# Patient Record
Sex: FEMALE | Race: BLACK OR AFRICAN AMERICAN | NOT HISPANIC OR LATINO | Employment: FULL TIME | URBAN - METROPOLITAN AREA
[De-identification: names, ages, dates, MRNs, and addresses within clinical notes are randomized per-mention and may not be internally consistent; named-entity substitution may affect disease eponyms.]

---

## 2021-05-27 ENCOUNTER — SOCIAL WORK (OUTPATIENT)
Dept: ADMINISTRATIVE | Facility: OTHER | Age: 26
End: 2021-05-27

## 2021-06-07 PROBLEM — Z28.39 RUBELLA NON-IMMUNE STATUS, ANTEPARTUM: Status: ACTIVE | Noted: 2021-06-07

## 2021-06-07 PROBLEM — O09.899 RUBELLA NON-IMMUNE STATUS, ANTEPARTUM: Status: ACTIVE | Noted: 2021-06-07

## 2021-06-14 PROBLEM — O23.41 URINARY TRACT INFECTION IN MOTHER DURING FIRST TRIMESTER OF PREGNANCY: Status: ACTIVE | Noted: 2021-06-14

## 2021-06-14 PROBLEM — O09.899 HISTORY OF PRETERM DELIVERY, CURRENTLY PREGNANT: Status: ACTIVE | Noted: 2021-06-14

## 2024-10-22 ENCOUNTER — OFFICE VISIT (OUTPATIENT)
Dept: INTERNAL MEDICINE | Facility: CLINIC | Age: 29
End: 2024-10-22
Payer: COMMERCIAL

## 2024-10-22 ENCOUNTER — TELEPHONE (OUTPATIENT)
Dept: INTERNAL MEDICINE | Facility: CLINIC | Age: 29
End: 2024-10-22

## 2024-10-22 ENCOUNTER — CLINICAL SUPPORT (OUTPATIENT)
Dept: REHABILITATION | Facility: HOSPITAL | Age: 29
End: 2024-10-22
Payer: COMMERCIAL

## 2024-10-22 VITALS
HEART RATE: 89 BPM | OXYGEN SATURATION: 99 % | SYSTOLIC BLOOD PRESSURE: 94 MMHG | HEIGHT: 64 IN | TEMPERATURE: 97 F | DIASTOLIC BLOOD PRESSURE: 60 MMHG | BODY MASS INDEX: 32.41 KG/M2 | WEIGHT: 189.81 LBS

## 2024-10-22 DIAGNOSIS — R51.9 DAILY HEADACHE: Primary | ICD-10-CM

## 2024-10-22 DIAGNOSIS — Z90.79 H/O BILATERAL SALPINGECTOMY: ICD-10-CM

## 2024-10-22 DIAGNOSIS — R51.9 DAILY HEADACHE: ICD-10-CM

## 2024-10-22 DIAGNOSIS — G44.221 CHRONIC TENSION-TYPE HEADACHE, INTRACTABLE: Primary | ICD-10-CM

## 2024-10-22 DIAGNOSIS — J45.20 MILD INTERMITTENT ASTHMA WITHOUT COMPLICATION: ICD-10-CM

## 2024-10-22 PROBLEM — O09.899 HISTORY OF PRETERM DELIVERY, CURRENTLY PREGNANT: Status: RESOLVED | Noted: 2021-06-14 | Resolved: 2024-10-22

## 2024-10-22 PROBLEM — O23.41 URINARY TRACT INFECTION IN MOTHER DURING FIRST TRIMESTER OF PREGNANCY: Status: RESOLVED | Noted: 2021-06-14 | Resolved: 2024-10-22

## 2024-10-22 PROCEDURE — 1159F MED LIST DOCD IN RCRD: CPT | Mod: CPTII,S$GLB,, | Performed by: PHYSICIAN ASSISTANT

## 2024-10-22 PROCEDURE — 3074F SYST BP LT 130 MM HG: CPT | Mod: CPTII,S$GLB,, | Performed by: PHYSICIAN ASSISTANT

## 2024-10-22 PROCEDURE — 3008F BODY MASS INDEX DOCD: CPT | Mod: CPTII,S$GLB,, | Performed by: PHYSICIAN ASSISTANT

## 2024-10-22 PROCEDURE — 97530 THERAPEUTIC ACTIVITIES: CPT | Mod: PN

## 2024-10-22 PROCEDURE — 97161 PT EVAL LOW COMPLEX 20 MIN: CPT | Mod: PN

## 2024-10-22 PROCEDURE — 99999 PR PBB SHADOW E&M-NEW PATIENT-LVL V: CPT | Mod: PBBFAC,,, | Performed by: PHYSICIAN ASSISTANT

## 2024-10-22 PROCEDURE — 97140 MANUAL THERAPY 1/> REGIONS: CPT | Mod: PN

## 2024-10-22 PROCEDURE — 3078F DIAST BP <80 MM HG: CPT | Mod: CPTII,S$GLB,, | Performed by: PHYSICIAN ASSISTANT

## 2024-10-22 PROCEDURE — 99204 OFFICE O/P NEW MOD 45 MIN: CPT | Mod: S$GLB,,, | Performed by: PHYSICIAN ASSISTANT

## 2024-10-22 PROCEDURE — 1160F RVW MEDS BY RX/DR IN RCRD: CPT | Mod: CPTII,S$GLB,, | Performed by: PHYSICIAN ASSISTANT

## 2024-10-22 RX ORDER — AMITRIPTYLINE HYDROCHLORIDE 10 MG/1
10 TABLET, FILM COATED ORAL NIGHTLY
Qty: 30 TABLET | Refills: 1 | Status: SHIPPED | OUTPATIENT
Start: 2024-10-22 | End: 2025-10-22

## 2024-10-22 RX ORDER — AMITRIPTYLINE HYDROCHLORIDE 10 MG/1
10 TABLET, FILM COATED ORAL NIGHTLY
Qty: 30 TABLET | Refills: 1 | Status: SHIPPED | OUTPATIENT
Start: 2024-10-22 | End: 2024-10-22 | Stop reason: SDUPTHER

## 2024-10-22 NOTE — PLAN OF CARE
[facility]   Physical Therapy Initial Evaluation     Date: 10/22/2024   Name: Maria Isabel Cervantes  Clinic Number: [id number]    Therapy Diagnosis:   Encounter Diagnoses   Name Primary?    Daily headache     Chronic tension-type headache, intractable Yes     Physician: Juanita Nazario*    Physician Orders: PT Eval and Treat   Medical Diagnosis from Referral: Chronic Headache   Evaluation Date: 10/22/2024  Authorization Period Expiration: 12/31/2024  Plan of Care Expiration: 12/31/2024  Progress Note Due: 10th visit  Visit # / Visits authorized: 1/ 12   FOTO: 1/ 3     Precautions: Standard    Time In: 1:15p  Time Out: 2:00p  Total Appointment Time (timed & untimed codes): 45 minutes    SUBJECTIVE   Date of onset: 1.5 year ago    History of current condition - Maria Isabel reports: chronic headaches for the past year.   She reports chronic headaches persisting for approximately 1.5 years, occurring about 5 times per week. The location varies, and she describes the pain as feeling like her brain is squeezing and sometimes stabbing/ throbbing.   Associated symptoms include dizziness with intense headaches, forgetfulness, occasional nausea, occasional tinnitus, numbness in fingers and legs, and ankle locking.   Trigger: Bending over to  item from floor    Falls: none    Imaging, none: NA    Prior Therapy: none  Social History:  lives with their family  Occupation: teacher:    Prior Level of Function: Independent   Current Level of Function: Independent       Pain:  Current 3/10, worst 9/10, best 2/10   Location: bilateral neck  and scalp    Description: Aching and Throbbing  Aggravating Factors: Bending  Easing Factors: relaxation    Patients goals: decrease headache pain     Medical History:   Past Medical History:   Diagnosis Date    Abnormal Pap smear of cervix     Allergy     Anemia     Asthma        Surgical History:   Maria Isabel Cervantes  has a past surgical history that  includes Cervical conization w/ laser.    Medications:   Maria Isabel has a current medication list which includes the following prescription(s): albuterol, amitriptyline, cetirizine, fluticasone propionate, breatherite mdi spacer, right step prenatal vitamins, and prenatal vitamin.    Allergies:   Review of patient's allergies indicates:   Allergen Reactions    Pediazole [erythromycin-sulfisoxazole]     Rocephin [ceftriaxone] Rash          OBJECTIVE         CMS Impairment/Limitation/Restriction for FOTO Pain Survey    Therapist reviewed FOTO scores for Maria Isabel Cervantes on 10/22/2024.   FOTO documents entered into [employer] - see Media section.    Limitation Score: 48%            CERVICAL  (single inclinometer at top of head) AROM  (degrees/%) Pain/Dysfunction with Movement   Flexion 55    Extension 50    Right side bending 35    Left side bending 35    Right rotation 75     Left rotation 70      Strength:  U/E MMT Right Left Pain/Dysfunction with Movement   Cervical SB 5/5 5/5    Upper Trapezial 5/5 5/5    Shoulder Flexion 4+/5 5/5    Shoulder Abduction 5/5 5/5    Rhomboids 4+/5 4+/5    Mid Traps 4/5 4/5    Low Traps 4/5 4/5       Decreased MLT: Anterior chest musculature and latissimus dorsi.    Joint Mobility: mild OA and C1-2 and CTJ hypomobility    Palpation:   Patient tender with increased tone over bilateral  upper trapezial muscle, levator scapula, latissimus dorsi, teres minor, posterior deltoid, infraspinatus, subscapularis, anterior  chest muscle, posterior cervical muscle, sternocleidomastoid, AC joint, suboccipital muscle, medial/lateral scapular muscle's.     TREATMENT     Total Treatment time (time-based codes) separate from Evaluation: 18 minutes      Maria Isabel received the treatments listed below:    Maria Isabel received the following manual therapy techniques: Joint mobilizations, Manual traction, Soft tissue Mobilization, and dry needling were applied to the: cervical for 10 minutes, including:  Marya velázquez  Cervical  distraction  Subocc nods  FDN: UT, sub occ 2 pts, and T/2 multifidi with estim 5 min     Pt participated in dynamic functional therapeutic activities to improve functional performance for 8  minutes, including:  HEP:  SNAGS, open books, and cervical retraction         PATIENT EDUCATION AND HOME EXERCISES     Education provided:   - yes    Written Home Exercises Provided: yes. Exercises were reviewed and Maria Isabel was able to demonstrate them prior to the end of the session.  Maria Isabel demonstrated good  understanding of the education provided. See EMR under Patient Instructions for exercises provided during therapy sessions.    ASSESSMENT     Maria Isabel is a 29 y.o. female referred to outpatient Physical Therapy with a medical diagnosis of chronic headaches, pt presents with signs and symptoms consistent with diagnosis. The patient presents with impairments which include weakness, impaired self care skills, impaired functional mobility, decreased upper extremity function, pain, abnormal tone, decreased ROM, impaired joint extensibility, and impaired muscle length.  These impairments are limiting patient's ability to complete ADLs, home chores, , work related tasks, driving, and lifting.     Pt prognosis is Good due to personal factors and co-morbidities listed below.   Pt will benefit from skilled outpatient Physical Therapy to address the deficits stated above and in the chart below, provide pt/family education, and to maximize pt's level of independence.     Plan of care discussed with patient: Yes  Pt's spiritual, cultural and educational needs considered and patient is agreeable to the plan of care and goals as stated below:     Anticipated Barriers for therapy: none    Medical Necessity is demonstrated by the following  History  Co-morbidities and personal factors that may impact the plan of care Co-morbidities:   See medical hx    Personal Factors:   no deficits     low   Examination  Body Structures and  Functions, activity limitations and participation restrictions that may impact the plan of care Body Regions:   head  neck    Body Systems:    ROM  strength  joint mobility, muscle tone, muscle length    Participation Restrictions:   See current level of function listed above     Activity limitations:   Learning and applying knowledge  no deficits    General Tasks and Commands  no deficits    Communication  no deficits    Mobility  lifting and carrying objects    Self care  no deficits    Domestic Life  shopping  cooking  doing house work (cleaning house, washing dishes, laundry)    Interactions/Relationships  no deficits    Life Areas  employment    Community and Social Life  community life  recreation and leisure         low   Clinical Presentation stable and uncomplicated low   Decision Making/ Complexity Score: low     Goals: Reviewed:10/22/2024    Short Term Goals: In 6 weeks   1. Patient  to be educated on HEP.  2.Patient to have pain less than 5/10 at worst, in order to improve QOL.  3. Patient to report less than 3 headaches in a 7 day period    Long Term Goals: In 10 weeks  1. Patient to improve score on the FOTO to 40% or less, in order to improve QOL.  2. Patient to demo increase in UE strength to 5/5, in order to improve endurance with activity.  3. Patient to have decreased pain to 2/10 at worst, in order to improve QOL.  4. Patient to perform daily activities including work related tasks and home chores without increased symptoms.      PLAN     Plan of care Certification: 10/22/2024 to 1/1/2025.    Outpatient Physical Therapy 1-2 times weekly for 10 weeks to include the following interventions: Cervical/Lumbar Traction, Electrical Stimulation prn, Manual Therapy, Moist Heat/ Ice, Neuromuscular Re-ed, Orthotic Management and Training, Patient Education, Self Care, Therapeutic Activities, Therapeutic Exercise, and dry needling  .     Alicia Suarez, PT      I CERTIFY THE NEED FOR THESE SERVICES FURNISHED  UNDER THIS PLAN OF TREATMENT AND WHILE UNDER MY CARE   Physician's comments:     Physician's Signature: ___________________________________________________

## 2024-10-22 NOTE — PROGRESS NOTES
Subjective:      Patient ID: Maria Isabel Cervantes is a 29 y.o. female.    Chief Complaint: Headache (Pt states she has been having persistent headaches for over a year. Pt states her last insurance would not pay for a CT scan but now she has a different health insurance company. Pt states her headaches makes her very forgetful. Pt states her headaches can come from her bending down.)    Headache   This is a chronic problem. The current episode started more than 1 year ago. The problem occurs daily. The problem has been gradually worsening. The pain is located in the Bilateral region. Associated symptoms include dizziness (only when severe), muscle aches, rhinorrhea and tinnitus. Pertinent negatives include no abdominal pain, abnormal behavior, anorexia, back pain, blurred vision, coughing, drainage, ear pain, eye pain, eye redness, eye watering, facial sweating, fever, hearing loss, insomnia, loss of balance, nausea, neck pain, numbness, phonophobia, photophobia, scalp tenderness, seizures, sinus pressure, sore throat, swollen glands, tingling, visual change, vomiting, weakness or weight loss. Associated symptoms comments: Memory loss. Nothing aggravates the symptoms.   History of Present Illness    CHIEF COMPLAINT:  Ms. Cervantes presents today for chronic headaches.    CHRONIC HEADACHES:  She reports chronic headaches persisting for approximately 1.5 years, occurring about 5 times per week. The location varies, and she describes the pain as feeling like her brain is squeezing and sometimes stabbing. Associated symptoms include dizziness with intense headaches, forgetfulness, occasional nausea, occasional tinnitus, numbness in fingers and legs, and ankle locking. Bending over is identified as a trigger. She denies eye drainage, rhinnorhea, or vomiting.    PREVIOUS TREATMENTS:  Previous treatments have included antibiotics for suspected sinus problems and Flexeril for suspected tension headaches, both of which were  ineffective. Also failed tx with maxalt.     MEDICAL HISTORY:  She has a history of asthma. Stable and controlled with rescue inhaler.     FAMILY HISTORY:  She reports a family history of brain tumor in her paternal grandmother, who  from the condition, indicating a potentially high-risk background for cancer.    Medications were reviewed        Patient Active Problem List   Diagnosis    Rubella non-immune status, antepartum    Asthma    History of conization of cervix affecting pregnancy in first trimester    Daily headache    H/O bilateral salpingectomy         Current Outpatient Medications:     albuterol (PROVENTIL HFA) 90 mcg/actuation inhaler, Inhale 2 puffs into the lungs every 6 (six) hours as needed for Wheezing. Rescue, Disp: 18 g, Rfl: 0    amitriptyline (ELAVIL) 10 MG tablet, Take 1 tablet (10 mg total) by mouth every evening., Disp: 30 tablet, Rfl: 1    cetirizine (ZYRTEC) 10 MG tablet, Take 10 mg by mouth once daily., Disp: , Rfl:     fluticasone propionate (FLONASE) 50 mcg/actuation nasal spray, 1 spray (50 mcg total) by Each Nostril route once daily., Disp: 9.9 mL, Rfl: 0    inhalation spacing device (BREATHERITE MDI SPACER), Use as directed for inhalation., Disp: 1 each, Rfl: 0    prenatal vit-iron fum-folic ac (RIGHT STEP PRENATAL VITAMINS) 27 mg iron- 0.8 mg Tab, Take 1 tablet by mouth once daily., Disp: 30 tablet, Rfl: 1    PRENATAL VITAMIN 27 mg iron- 0.8 mg Tab, Take 1 tablet by mouth once daily., Disp: , Rfl:     Review of Systems   Constitutional:  Negative for activity change, appetite change, chills, diaphoresis, fatigue, fever, unexpected weight change and weight loss.   HENT:  Positive for congestion, postnasal drip, rhinorrhea and tinnitus. Negative for ear pain, hearing loss, sinus pressure, sore throat, trouble swallowing and voice change.    Eyes: Negative.  Negative for blurred vision, photophobia, pain, redness and visual disturbance.   Respiratory: Negative.  Negative for  "cough, choking, chest tightness and shortness of breath.    Cardiovascular:  Negative for chest pain, palpitations and leg swelling.   Gastrointestinal:  Negative for abdominal distention, abdominal pain, anorexia, blood in stool, constipation, diarrhea, nausea and vomiting.   Endocrine: Negative for cold intolerance, heat intolerance, polydipsia and polyuria.   Genitourinary: Negative.  Negative for difficulty urinating and frequency.   Musculoskeletal:  Negative for arthralgias, back pain, gait problem, joint swelling, myalgias and neck pain.   Skin:  Negative for color change, pallor, rash and wound.   Neurological:  Positive for dizziness (only when severe), speech difficulty (stutter) and headaches. Negative for tingling, tremors, seizures, syncope, facial asymmetry, weakness, light-headedness, numbness and loss of balance.   Hematological:  Negative for adenopathy.   Psychiatric/Behavioral:  Negative for behavioral problems, confusion, self-injury, sleep disturbance and suicidal ideas. The patient is not nervous/anxious and does not have insomnia.      Objective:   BP 94/60   Pulse 89   Temp 97 °F (36.1 °C) (Tympanic)   Ht 5' 4" (1.626 m)   Wt 86.1 kg (189 lb 13.1 oz)   LMP 10/02/2024   SpO2 99%   BMI 32.58 kg/m²     Physical Exam  Vitals reviewed.   Constitutional:       General: She is not in acute distress.     Appearance: Normal appearance. She is well-developed. She is not ill-appearing, toxic-appearing or diaphoretic.   HENT:      Head: Normocephalic and atraumatic.      Right Ear: Tympanic membrane, ear canal and external ear normal.      Left Ear: Tympanic membrane, ear canal and external ear normal.      Nose: Congestion and rhinorrhea present.      Mouth/Throat:      Mouth: Mucous membranes are moist.      Pharynx: No oropharyngeal exudate or posterior oropharyngeal erythema.   Eyes:      Conjunctiva/sclera: Conjunctivae normal.      Pupils: Pupils are equal, round, and reactive to light. "   Cardiovascular:      Rate and Rhythm: Normal rate and regular rhythm.      Heart sounds: Normal heart sounds. No murmur heard.     No friction rub. No gallop.   Pulmonary:      Effort: Pulmonary effort is normal. No respiratory distress.      Breath sounds: Normal breath sounds. No wheezing or rales.   Chest:      Chest wall: No tenderness.   Abdominal:      General: There is no distension.      Palpations: Abdomen is soft.      Tenderness: There is no abdominal tenderness.   Musculoskeletal:         General: Normal range of motion.      Cervical back: Normal range of motion and neck supple.   Lymphadenopathy:      Cervical: No cervical adenopathy.   Skin:     General: Skin is warm and dry.      Capillary Refill: Capillary refill takes less than 2 seconds.      Findings: No rash.   Neurological:      Mental Status: She is alert and oriented to person, place, and time.      Motor: No weakness.      Coordination: Coordination normal.      Gait: Gait normal.   Psychiatric:         Mood and Affect: Mood normal.         Behavior: Behavior normal.         Thought Content: Thought content normal.         Judgment: Judgment normal.         Assessment:     1. Daily headache    2. H/O bilateral salpingectomy    3. Mild intermittent asthma without complication      Plan:   Daily headache  -     amitriptyline (ELAVIL) 10 MG tablet; Take 1 tablet (10 mg total) by mouth every evening.  Dispense: 30 tablet; Refill: 1  -     Ambulatory referral/consult to Neurology; Future; Expected date: 10/29/2024  -     Ambulatory referral/consult to Physical/Occupational Therapy; Future; Expected date: 10/29/2024    H/O bilateral salpingectomy    Mild intermittent asthma without complication    -stable on rescue inhaler    Assessment & Plan    Assessed chronic headaches, occurring 5 times per week with increasing frequency and severity  Considered various headache types (tension, cluster) given diverse symptom presentation  Ruled out stress,  elevated blood pressure, and sinus problems as primary causes  Noted concerning symptoms: dizziness, nausea, numbness in extremities during headaches  Will initiate preventative medication and pursue further evaluation due to symptom complexity and family history of brain tumor    CHRONIC HEADACHES:  - Referred to neurology for further evaluation of chronic headaches.  - Referred to physical therapy for potential dry needling and other treatments for tension headaches.    MEDICATIONS/SUPPLEMENTS:  - Explained that the prescribed medication is preventative and requires time to become effective.  - Discussed potential side effects of the medication, including dizziness and fatigue.  - Started Elavil (amitriptyline) at a low dose to be taken 20-30 minutes before bedtime nightly.  - MsSelam Cervantes to maintain adequate hydration.    FOLLOW UP:  - Follow up in 1 month to assess effectiveness of Elavil.  - Contact the office if no communication from neurology within the next few weeks.  - Contact the office if experiencing any side effects from the medication.       Follow up in about 4 weeks (around 11/19/2024), or if symptoms worsen or fail to improve.

## 2024-10-22 NOTE — TELEPHONE ENCOUNTER
----- Message from Med Assistant Sylvie sent at 10/22/2024 10:06 AM CDT -----  Contact: [phone]  Please advise. I've canceled medication just need to reorder  ----- Message -----  From: Enrike Funk  Sent: 10/22/2024   9:55 AM CDT  To: Aravind REHMAN Staff (Int Med)    Type:  RX Refill Request    Who Called: DES MARIE [[id number]]  Refill or New Rx:REFILL   RX Name and Strength:amitriptyline (ELAVIL) 10 MG tablet  How is the patient currently taking it? (ex. 1XDay):  Is this a 30 day or 90 day RX:  Preferred Pharmacy with phone number:  Local or Mail Order:local  Ordering Provider:ARAVIND  Would the patient rather a call back or a response via [facility]? Call back  Best Call Back Number: [phone]  Additional Information: [id number] ..... IT WAS SENT TO THE [facility]        [facility] [employer] [address]

## 2024-12-09 ENCOUNTER — OFFICE VISIT (OUTPATIENT)
Dept: NEUROLOGY | Facility: CLINIC | Age: 29
End: 2024-12-09
Payer: COMMERCIAL

## 2024-12-09 ENCOUNTER — LAB VISIT (OUTPATIENT)
Dept: LAB | Facility: HOSPITAL | Age: 29
End: 2024-12-09
Payer: COMMERCIAL

## 2024-12-09 VITALS
DIASTOLIC BLOOD PRESSURE: 72 MMHG | WEIGHT: 188.06 LBS | SYSTOLIC BLOOD PRESSURE: 118 MMHG | OXYGEN SATURATION: 99 % | RESPIRATION RATE: 16 BRPM | HEIGHT: 64 IN | HEART RATE: 86 BPM | BODY MASS INDEX: 32.11 KG/M2

## 2024-12-09 DIAGNOSIS — R41.3 OTHER AMNESIA: ICD-10-CM

## 2024-12-09 DIAGNOSIS — G43.719 INTRACTABLE CHRONIC MIGRAINE WITHOUT AURA AND WITHOUT STATUS MIGRAINOSUS: Primary | ICD-10-CM

## 2024-12-09 DIAGNOSIS — R41.89 BRAIN FOG: ICD-10-CM

## 2024-12-09 DIAGNOSIS — H93.13 TINNITUS OF BOTH EARS: ICD-10-CM

## 2024-12-09 DIAGNOSIS — R51.9 DAILY HEADACHE: ICD-10-CM

## 2024-12-09 DIAGNOSIS — R20.2 NUMBNESS AND TINGLING: ICD-10-CM

## 2024-12-09 DIAGNOSIS — G44.221 CHRONIC TENSION-TYPE HEADACHE, INTRACTABLE: ICD-10-CM

## 2024-12-09 DIAGNOSIS — R26.89 BALANCE PROBLEM: ICD-10-CM

## 2024-12-09 DIAGNOSIS — G43.719 INTRACTABLE CHRONIC MIGRAINE WITHOUT AURA AND WITHOUT STATUS MIGRAINOSUS: ICD-10-CM

## 2024-12-09 DIAGNOSIS — R20.0 NUMBNESS AND TINGLING: ICD-10-CM

## 2024-12-09 DIAGNOSIS — R40.4 EPISODES OF STARING: ICD-10-CM

## 2024-12-09 DIAGNOSIS — R42 DIZZINESS AND GIDDINESS: ICD-10-CM

## 2024-12-09 PROBLEM — K81.0 ACUTE CHOLECYSTITIS: Status: ACTIVE | Noted: 2023-10-01

## 2024-12-09 PROBLEM — D27.1 DERMOID CYST OF LEFT OVARY: Status: ACTIVE | Noted: 2023-03-17

## 2024-12-09 LAB
ALBUMIN SERPL BCP-MCNC: 3.9 G/DL (ref 3.5–5.2)
ALP SERPL-CCNC: 73 U/L (ref 40–150)
ALT SERPL W/O P-5'-P-CCNC: 15 U/L (ref 10–44)
ANION GAP SERPL CALC-SCNC: 10 MMOL/L (ref 8–16)
AST SERPL-CCNC: 15 U/L (ref 10–40)
BASOPHILS # BLD AUTO: 0.01 K/UL (ref 0–0.2)
BASOPHILS NFR BLD: 0.2 % (ref 0–1.9)
BILIRUB SERPL-MCNC: 0.5 MG/DL (ref 0.1–1)
BUN SERPL-MCNC: 7 MG/DL (ref 6–20)
CALCIUM SERPL-MCNC: 9.4 MG/DL (ref 8.7–10.5)
CHLORIDE SERPL-SCNC: 107 MMOL/L (ref 95–110)
CO2 SERPL-SCNC: 24 MMOL/L (ref 23–29)
CREAT SERPL-MCNC: 0.9 MG/DL (ref 0.5–1.4)
CRP SERPL-MCNC: 1.2 MG/L (ref 0–8.2)
DIFFERENTIAL METHOD BLD: ABNORMAL
EOSINOPHIL # BLD AUTO: 0.2 K/UL (ref 0–0.5)
EOSINOPHIL NFR BLD: 4.3 % (ref 0–8)
ERYTHROCYTE [DISTWIDTH] IN BLOOD BY AUTOMATED COUNT: 12.7 % (ref 11.5–14.5)
ERYTHROCYTE [SEDIMENTATION RATE] IN BLOOD BY PHOTOMETRIC METHOD: 8 MM/HR (ref 0–36)
EST. GFR  (NO RACE VARIABLE): >60 ML/MIN/1.73 M^2
FOLATE SERPL-MCNC: 14.3 NG/ML (ref 4–24)
GLUCOSE SERPL-MCNC: 111 MG/DL (ref 70–110)
HCT VFR BLD AUTO: 36 % (ref 37–48.5)
HCYS SERPL-SCNC: 6.4 UMOL/L (ref 4–15.5)
HGB BLD-MCNC: 12 G/DL (ref 12–16)
HIV 1+2 AB+HIV1 P24 AG SERPL QL IA: NORMAL
IMM GRANULOCYTES # BLD AUTO: 0.01 K/UL (ref 0–0.04)
IMM GRANULOCYTES NFR BLD AUTO: 0.2 % (ref 0–0.5)
LYMPHOCYTES # BLD AUTO: 2.1 K/UL (ref 1–4.8)
LYMPHOCYTES NFR BLD: 40.6 % (ref 18–48)
MCH RBC QN AUTO: 29.9 PG (ref 27–31)
MCHC RBC AUTO-ENTMCNC: 33.3 G/DL (ref 32–36)
MCV RBC AUTO: 90 FL (ref 82–98)
MONOCYTES # BLD AUTO: 0.3 K/UL (ref 0.3–1)
MONOCYTES NFR BLD: 5.2 % (ref 4–15)
NEUTROPHILS # BLD AUTO: 2.6 K/UL (ref 1.8–7.7)
NEUTROPHILS NFR BLD: 49.5 % (ref 38–73)
NRBC BLD-RTO: 0 /100 WBC
PLATELET # BLD AUTO: 245 K/UL (ref 150–450)
PMV BLD AUTO: 10.5 FL (ref 9.2–12.9)
POTASSIUM SERPL-SCNC: 3.8 MMOL/L (ref 3.5–5.1)
PROT SERPL-MCNC: 7.4 G/DL (ref 6–8.4)
RBC # BLD AUTO: 4.01 M/UL (ref 4–5.4)
SODIUM SERPL-SCNC: 141 MMOL/L (ref 136–145)
T4 FREE SERPL-MCNC: 0.88 NG/DL (ref 0.71–1.51)
TREPONEMA PALLIDUM IGG+IGM AB [PRESENCE] IN SERUM OR PLASMA BY IMMUNOASSAY: NONREACTIVE
TSH SERPL DL<=0.005 MIU/L-ACNC: 0.85 UIU/ML (ref 0.4–4)
VIT B12 SERPL-MCNC: 232 PG/ML (ref 210–950)
WBC # BLD AUTO: 5.17 K/UL (ref 3.9–12.7)

## 2024-12-09 PROCEDURE — 1160F RVW MEDS BY RX/DR IN RCRD: CPT | Mod: CPTII,S$GLB,,

## 2024-12-09 PROCEDURE — 1159F MED LIST DOCD IN RCRD: CPT | Mod: CPTII,S$GLB,,

## 2024-12-09 PROCEDURE — 82746 ASSAY OF FOLIC ACID SERUM: CPT

## 2024-12-09 PROCEDURE — 36415 COLL VENOUS BLD VENIPUNCTURE: CPT

## 2024-12-09 PROCEDURE — 84439 ASSAY OF FREE THYROXINE: CPT

## 2024-12-09 PROCEDURE — 84443 ASSAY THYROID STIM HORMONE: CPT

## 2024-12-09 PROCEDURE — 3078F DIAST BP <80 MM HG: CPT | Mod: CPTII,S$GLB,,

## 2024-12-09 PROCEDURE — 85652 RBC SED RATE AUTOMATED: CPT

## 2024-12-09 PROCEDURE — 83090 ASSAY OF HOMOCYSTEINE: CPT

## 2024-12-09 PROCEDURE — 86140 C-REACTIVE PROTEIN: CPT

## 2024-12-09 PROCEDURE — 84425 ASSAY OF VITAMIN B-1: CPT

## 2024-12-09 PROCEDURE — 99999 PR PBB SHADOW E&M-EST. PATIENT-LVL V: CPT | Mod: PBBFAC,,,

## 2024-12-09 PROCEDURE — 3008F BODY MASS INDEX DOCD: CPT | Mod: CPTII,S$GLB,,

## 2024-12-09 PROCEDURE — 82607 VITAMIN B-12: CPT

## 2024-12-09 PROCEDURE — 3074F SYST BP LT 130 MM HG: CPT | Mod: CPTII,S$GLB,,

## 2024-12-09 PROCEDURE — 87389 HIV-1 AG W/HIV-1&-2 AB AG IA: CPT

## 2024-12-09 PROCEDURE — 86038 ANTINUCLEAR ANTIBODIES: CPT

## 2024-12-09 PROCEDURE — 86593 SYPHILIS TEST NON-TREP QUANT: CPT

## 2024-12-09 PROCEDURE — 85025 COMPLETE CBC W/AUTO DIFF WBC: CPT

## 2024-12-09 PROCEDURE — 99205 OFFICE O/P NEW HI 60 MIN: CPT | Mod: S$GLB,,,

## 2024-12-09 PROCEDURE — 80053 COMPREHEN METABOLIC PANEL: CPT

## 2024-12-09 RX ORDER — SUMATRIPTAN SUCCINATE 50 MG/1
50 TABLET ORAL DAILY PRN
Qty: 9 TABLET | Refills: 0 | Status: SHIPPED | OUTPATIENT
Start: 2024-12-09 | End: 2025-01-08

## 2024-12-09 RX ORDER — MONTELUKAST SODIUM 10 MG/1
TABLET ORAL
COMMUNITY
Start: 2024-07-17

## 2024-12-09 RX ORDER — ASPIRIN 325 MG
50000 TABLET, DELAYED RELEASE (ENTERIC COATED) ORAL
COMMUNITY
Start: 2024-06-13

## 2024-12-09 RX ORDER — HYDROCODONE BITARTRATE AND ACETAMINOPHEN 5; 325 MG/1; MG/1
TABLET ORAL
COMMUNITY
Start: 2024-11-15 | End: 2024-12-09

## 2024-12-09 RX ORDER — TOPIRAMATE 25 MG/1
25 TABLET ORAL 2 TIMES DAILY
Qty: 60 TABLET | Refills: 0 | Status: SHIPPED | OUTPATIENT
Start: 2024-12-09 | End: 2025-01-08

## 2024-12-09 NOTE — PROGRESS NOTES
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 SUPPLEMENTS:     Helpful supplements include Co-Q 10, B2, Mg, Feverfew (Dolovent combination) and butterbur (Petadolex)        NEUROPHARMACOLOGY     NEXT OPTIONS:     Topiramate/Topamax (TPM) slow titration to 50 mg BID which can cause mental slowing, transient tingling, kidney stones, weight loss, cleft lip and palate and rarely glaucoma and visual field defects . The patient was encouraged to drink a lot of fluids.     Zonisamide/Zonegran (ZNS) 100-400 mg QHS is a good alternative to TPM in case of SE/AE.     Amitriptyline/Elavil (TCA) slow titration to 100-Age which can cause sleepiness, dry eyes, dry mouth, urinary retention, and rarely cardiac arrhythmias    Propranolol/Inderal  (BB)slow titration to 80 mg BID which can cause low blood pressure, slow heart rate, erectile dysfunction, depression, airway obstruction and heart failure exacerbations. Cannot be used with migraine associate with focal neurological deficits.    Lamotrigine/Lamictal  (LTG)slow titration to 100 mg BID which can cause serious skin rash and rare cardiac arrhythmias. LTG is  superior to other therapies for specifically reducing migraine aura.     ANTI-CGRP AGENTS: Qulipta (alogepant) 60 mg QD, Erenumab (Aimovig) 140 mg SQ Pen monthly (Reported cases of Constipation and BP elevation) , Galcanezumab (Emgality) 120 mg SQ Pen monthly after a loading dose of 240 mg  and Fremnezumab (Ajovy) (Ligand Blocker): 225 mg SQ monthly or 675 mg every 3 months     Botox 200 units every 3 months.         LAST RESORT OPTIONS:      Namenda 10 mg BID     Valproic acid/ Depakote         NEUROMODULATION     Cefaly, Relivion, Nerivio and GammaCore (VNS)               WOMEN IN CHILD BEARING PERIOD     All migraine medications are not safe during pregnancy and the patient was made aware of this fact. Any pregnancy should be planned, and medications should be stopped PRIOR to pregnancy planning. Folic acid 1 mg daily was recommended. However, hormonal birth control complicates the management of migraine and can exacerbate migraine. If possible, mechanical contraception should be a better option.         PREGNANCY ISSUES         We had a lengthy discussion about managing migraine in patients who are trying to get pregnant or pregnant.    First, I recommend FA 1 mg QD     PRN medications are not safe. The safest option is Reglan PRN and not to be taken more than 2-3 times/week. Fioricet PRN is an option.     Traditional preventative options are not safe including Botox. Cefaly and Relivion are considered safe, but insurance does not cover it.        SCHOOL AND WORK ACCOMMODATIONS        Allow the patient to wear sunglasses or a cap and switch out fluorescent bulbs.    Allow the patient to arrive 5 minutes later and leave 5 minutes earlier to avoid noisy traffic.    Allow the patient to carry a water bottle and refill as needed.    Allow the patient to snack whenever is needed.    Allow the patient to decrease the computer brightness.    Allow the patient to take breaks as needed and extra time for assignments and  deadlines.    Allow the patient to avoid strenuous activity as needed.         MEDICAL/SURGICAL COMORBIDITIES     All relevant medical comorbidities noted and managed by primary care physician and medical care team.          HEALTHY LIFESTYLE AND PREVENTATIVE CARE    The patient to adhere to the age-appropriate health maintenance guidelines including screening tests and vaccinations. The patient to adhere to  healthy lifestyle, optimal weight, exercise, healthy diet, good sleep hygiene and avoiding drugs including smoking, alcohol and recreational drugs.    I spent a total of 72 minutes on the day of the visit.This includes face to face time and non-face to face time preparing to see the patient (eg, review of tests), obtaining and/or reviewing separately obtained history, documenting clinical information in the electronic or other health record, independently interpreting results and communicating results to the patient/family/caregiver, or care coordinator.     Please do not hesitate to contact me with any updates, questions or concerns.    No follow-ups on file.    Roberto Moseley, MSN, FNP-C    General Neurology

## 2024-12-10 LAB — ANA SER QL IF: NORMAL

## 2024-12-13 LAB — VIT B1 BLD-MCNC: 61 UG/L (ref 38–122)

## 2024-12-31 ENCOUNTER — HOSPITAL ENCOUNTER (OUTPATIENT)
Dept: PULMONOLOGY | Facility: HOSPITAL | Age: 29
Discharge: HOME | End: 2024-12-31
Payer: COMMERCIAL

## 2024-12-31 DIAGNOSIS — R40.4 EPISODES OF STARING: ICD-10-CM

## 2024-12-31 PROCEDURE — 95816 EEG AWAKE AND DROWSY: CPT

## 2025-01-02 PROBLEM — R40.4 EPISODES OF STARING: Status: ACTIVE | Noted: 2025-01-02

## 2025-01-03 NOTE — PROGRESS NOTES
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 migraine associate with focal neurological deficits (complicated migraine) in addition to drowsiness and potential impairment of driving ability. The patient verbalized understanding.    NSAIDs can cause peptic ulcers, renal insufficiency and may increase the risk of cardiovascular diseases.  SEs were discussed with the patient. The patient verbalized understanding.    Triptans have shown to be more effective than Gepatns with more SE/AE.      AVOID NARCOTICS (OPIATES)      1. No randomized controlled study shows pain-free results with opioids in the treatment of migraine.     2. The physiologic consequences of opioid use are adverse, occur quickly, and can be permanent. Decreased gray matter, release of calcitonin gene-related peptide, dynorphin, and pro-inflammatory peptides, and activation of excitatory glutamate receptors are all associated with opioid exposure.     3. Opioids are pro-nociceptive, prevent reversal of migraine  central sensitization, and interfere with triptan effectiveness.     4.Opioids precipitate bad clinical outcomes, especially transformation to daily headache.     5. They cause disease progression, comorbidity, and excessive health care consumption.           NEXT OPTIONS:    Triptans: Sumatriptan (Imitrex), Rizatriptan (Maxalt).    Gepants: Nuretc (rimegepant)75 mg >Ubrelvy (ubrogepant) 100 mg    Ditans: Reyvow (lasmiditan) 100 mg (No driving due to sedation)    Fioricet without codeine with Reglan.      Prednisone with Reglan.      LAST RESORT:     DHE NS Trudhesa (Max 2 a week)     C/I: concomitant use of vasoconstrictors like Triptans, strong CY inhibitors such as HAART PIs (eg, ritonavir, nelfinavir, or indinavir) and Macrolides (eg, erythromycin or clarithromycin), CAD, PVD, Stroke/TIA and Uncontrolled HTN.  Serious SEs include Vasospasm and Fibrosis (chronic use).       IMPENDING STATUS: Prednisone and Vistaril.    STATUS MIGRAINOSUS: ED-Infusion for Status Protocol.        PREVENTATIVE (MORE ACCURATELY MIGRAINE REDUCTION) MEDICATIONS:           Since the patient's headache is very frequent a lengthy discussion about preventative medications was carried out.The patient understands that prevention means DECREASING frequency and severity and NOT elimination.The patient was made aware that any new medication can cause serious allergic reaction.The medication is considered failure only if a therapeutic dose reached and maintained for 6-8 weeks.        HELPFUL SUPPLEMENTS:     Helpful supplements include Co-Q 10, B2, Mg, Feverfew (Dolovent combination) and butterbur (Petadolex)        NEUROPHARMACOLOGY     NEXT OPTIONS:     Topiramate/Topamax (TPM) slow titration to 50 mg BID which can cause mental slowing, transient tingling, kidney stones, weight loss, cleft lip and palate and rarely glaucoma and visual field defects . The patient was encouraged to drink a lot of fluids.     Zonisamide/Zonegran (ZNS) 100-400  mg QHS is a good alternative to TPM in case of SE/AE.     Amitriptyline/Elavil (TCA) slow titration to 100-Age which can cause sleepiness, dry eyes, dry mouth, urinary retention, and rarely cardiac arrhythmias    Propranolol/Inderal  (BB)slow titration to 80 mg BID which can cause low blood pressure, slow heart rate, erectile dysfunction, depression, airway obstruction and heart failure exacerbations. Cannot be used with migraine associate with focal neurological deficits.    Lamotrigine/Lamictal  (LTG)slow titration to 100 mg BID which can cause serious skin rash and rare cardiac arrhythmias. LTG is superior to other therapies for specifically reducing migraine aura.     ANTI-CGRP AGENTS: Qulipta (alogepant) 60 mg QD, Erenumab (Aimovig) 140 mg SQ Pen monthly (Reported cases of Constipation and BP elevation) , Galcanezumab (Emgality) 120 mg SQ Pen monthly after a loading dose of 240 mg  and Fremnezumab (Ajovy) (Ligand Blocker): 225 mg SQ monthly or 675 mg every 3 months     Botox 200 units every 3 months.         LAST RESORT OPTIONS:      Namenda 10 mg BID     Valproic acid/ Depakote         NEUROMODULATION     Cefaly, Relivion, Nerivio and GammaCore (VNS)               WOMEN IN CHILD BEARING PERIOD     All migraine medications are not safe during pregnancy and the patient was made aware of this fact. Any pregnancy should be planned, and medications should be stopped PRIOR to pregnancy planning. Folic acid 1 mg daily was recommended. However, hormonal birth control complicates the management of migraine and can exacerbate migraine. If possible, mechanical contraception should be a better option.         PREGNANCY ISSUES         We had a lengthy discussion about managing migraine in patients who are trying to get pregnant or pregnant.    First, I recommend FA 1 mg QD     PRN medications are not safe. The safest option is Reglan PRN and not to be taken more than 2-3 times/week. Fioricet PRN is an option.      Traditional preventative options are not safe including Botox. Cefaly and Relivion are considered safe, but insurance does not cover it.        SCHOOL AND WORK ACCOMMODATIONS        Allow the patient to wear sunglasses or a cap and switch out fluorescent bulbs.    Allow the patient to arrive 5 minutes later and leave 5 minutes earlier to avoid noisy traffic.    Allow the patient to carry a water bottle and refill as needed.    Allow the patient to snack whenever is needed.    Allow the patient to decrease the computer brightness.    Allow the patient to take breaks as needed and extra time for assignments and deadlines.    Allow the patient to avoid strenuous activity as needed.         MEDICAL/SURGICAL COMORBIDITIES     All relevant medical comorbidities noted and managed by primary care physician and medical care team.          HEALTHY LIFESTYLE AND PREVENTATIVE CARE    The patient to adhere to the age-appropriate health maintenance guidelines including screening tests and vaccinations. The patient to adhere to  healthy lifestyle, optimal weight, exercise, healthy diet, good sleep hygiene and avoiding drugs including smoking, alcohol and recreational drugs.    I spent a total of 47 minutes on the day of the visit.This includes face to face time and non-face to face time preparing to see the patient (eg, review of tests), obtaining and/or reviewing separately obtained history, documenting clinical information in the electronic or other health record, independently interpreting results and communicating results to the patient/family/caregiver, or care coordinator.     Please do not hesitate to contact me with any updates, questions or concerns.    No follow-ups on file.    Roberto Moseley, MSN, FNP-C    General Neurology

## 2025-01-06 ENCOUNTER — OFFICE VISIT (OUTPATIENT)
Dept: NEUROLOGY | Facility: CLINIC | Age: 30
End: 2025-01-06
Payer: COMMERCIAL

## 2025-01-06 VITALS
HEIGHT: 64 IN | DIASTOLIC BLOOD PRESSURE: 64 MMHG | WEIGHT: 188.06 LBS | HEART RATE: 69 BPM | BODY MASS INDEX: 32.11 KG/M2 | SYSTOLIC BLOOD PRESSURE: 97 MMHG

## 2025-01-06 DIAGNOSIS — R41.3 OTHER AMNESIA: ICD-10-CM

## 2025-01-06 DIAGNOSIS — G44.221 CHRONIC TENSION-TYPE HEADACHE, INTRACTABLE: ICD-10-CM

## 2025-01-06 DIAGNOSIS — R45.89 ANXIETY ABOUT TREATMENT: ICD-10-CM

## 2025-01-06 DIAGNOSIS — G43.719 INTRACTABLE CHRONIC MIGRAINE WITHOUT AURA AND WITHOUT STATUS MIGRAINOSUS: Primary | ICD-10-CM

## 2025-01-06 DIAGNOSIS — R20.2 NUMBNESS AND TINGLING: ICD-10-CM

## 2025-01-06 DIAGNOSIS — R20.0 NUMBNESS AND TINGLING: ICD-10-CM

## 2025-01-06 DIAGNOSIS — R42 DIZZINESS AND GIDDINESS: ICD-10-CM

## 2025-01-06 DIAGNOSIS — R51.9 DAILY HEADACHE: ICD-10-CM

## 2025-01-06 DIAGNOSIS — H93.13 TINNITUS OF BOTH EARS: ICD-10-CM

## 2025-01-06 DIAGNOSIS — Z51.81 MEDICATION MONITORING ENCOUNTER: ICD-10-CM

## 2025-01-06 DIAGNOSIS — R40.4 EPISODES OF STARING: ICD-10-CM

## 2025-01-06 DIAGNOSIS — R26.89 BALANCE PROBLEM: ICD-10-CM

## 2025-01-06 DIAGNOSIS — R41.89 BRAIN FOG: ICD-10-CM

## 2025-01-06 PROCEDURE — 99215 OFFICE O/P EST HI 40 MIN: CPT | Mod: S$GLB,,,

## 2025-01-06 PROCEDURE — 99999 PR PBB SHADOW E&M-EST. PATIENT-LVL V: CPT | Mod: PBBFAC,,,

## 2025-01-06 PROCEDURE — 1160F RVW MEDS BY RX/DR IN RCRD: CPT | Mod: CPTII,S$GLB,,

## 2025-01-06 PROCEDURE — 3074F SYST BP LT 130 MM HG: CPT | Mod: CPTII,S$GLB,,

## 2025-01-06 PROCEDURE — 3008F BODY MASS INDEX DOCD: CPT | Mod: CPTII,S$GLB,,

## 2025-01-06 PROCEDURE — 3078F DIAST BP <80 MM HG: CPT | Mod: CPTII,S$GLB,,

## 2025-01-06 PROCEDURE — 1159F MED LIST DOCD IN RCRD: CPT | Mod: CPTII,S$GLB,,

## 2025-01-06 RX ORDER — SUMATRIPTAN SUCCINATE 50 MG/1
50 TABLET ORAL DAILY PRN
Qty: 9 TABLET | Refills: 0 | Status: SHIPPED | OUTPATIENT
Start: 2025-01-06 | End: 2025-02-05

## 2025-01-06 RX ORDER — DIAZEPAM 5 MG/1
5 TABLET ORAL ONCE
Qty: 1 TABLET | Refills: 0 | Status: SHIPPED | OUTPATIENT
Start: 2025-01-06 | End: 2025-01-06

## 2025-01-06 RX ORDER — TOPIRAMATE 25 MG/1
25 TABLET ORAL 2 TIMES DAILY
Qty: 60 TABLET | Refills: 0 | Status: SHIPPED | OUTPATIENT
Start: 2025-01-06 | End: 2025-02-05

## 2025-01-08 ENCOUNTER — PATIENT MESSAGE (OUTPATIENT)
Dept: NEUROLOGY | Facility: CLINIC | Age: 30
End: 2025-01-08
Payer: COMMERCIAL

## 2025-01-13 ENCOUNTER — LAB VISIT (OUTPATIENT)
Dept: LAB | Facility: HOSPITAL | Age: 30
End: 2025-01-13
Payer: COMMERCIAL

## 2025-01-13 DIAGNOSIS — Z51.81 MEDICATION MONITORING ENCOUNTER: ICD-10-CM

## 2025-01-13 LAB
CREAT SERPL-MCNC: 0.8 MG/DL (ref 0.5–1.4)
EST. GFR  (NO RACE VARIABLE): >60 ML/MIN/1.73 M^2

## 2025-01-13 PROCEDURE — 36415 COLL VENOUS BLD VENIPUNCTURE: CPT

## 2025-01-13 PROCEDURE — 82565 ASSAY OF CREATININE: CPT

## 2025-01-23 NOTE — TELEPHONE ENCOUNTER
What did the pharmacy say about her valium order? It says on my end the prescription was confirmed by her pharmacy.      Disp Refills Start End   diazePAM (VALIUM) 5 MG tablet 1 tablet 0 1/6/2025 1/6/2025   Sig - Route: Take 1 tablet (5 mg total) by mouth once. 1 tab 30 minutes before MRI for 1 dose - Oral   Sent to pharmacy as: diazePAM (VALIUM) 5 MG tablet   E-Prescribing Status: Receipt confirmed by pharmacy (1/6/2025  8:52 AM CST)

## 2025-01-31 ENCOUNTER — PATIENT MESSAGE (OUTPATIENT)
Dept: NEUROLOGY | Facility: CLINIC | Age: 30
End: 2025-01-31
Payer: COMMERCIAL

## 2025-01-31 ENCOUNTER — TELEPHONE (OUTPATIENT)
Dept: NEUROLOGY | Facility: CLINIC | Age: 30
End: 2025-01-31
Payer: COMMERCIAL

## 2025-01-31 DIAGNOSIS — M50.20 CERVICAL DISC HERNIATION: ICD-10-CM

## 2025-01-31 DIAGNOSIS — G95.20 CERVICAL SPINAL CORD COMPRESSION: ICD-10-CM

## 2025-01-31 DIAGNOSIS — M54.12 CERVICAL RADICULOPATHY: Primary | ICD-10-CM

## 2025-01-31 NOTE — TELEPHONE ENCOUNTER
Called pt regarding message. She would like to know the results of her MRIs. I told her I would let Ms. Mejia they're ready to review and we'd let her know Ms. Mejia's findings. She verbalized understanding.

## 2025-01-31 NOTE — TELEPHONE ENCOUNTER
----- Message from Latha sent at 1/31/2025 10:17 AM CST -----  Regarding: Results  Contact: patient  Type:  Test Results    Who Called: Maria Isabel   Name of Test (Lab/Mammo/Etc):  MRI  Date of Test: 01/28/2028  Ordering Provider: Dr Roberto Moseley  Where the test was performed:  [facility]   Would the patient rather a call back or a response via [facility]?  Call back   Best Call Back Number:  [phone]     Additional Information:      ThanksDORY

## 2025-02-06 ENCOUNTER — TELEPHONE (OUTPATIENT)
Dept: NEUROSURGERY | Facility: CLINIC | Age: 30
End: 2025-02-06
Payer: COMMERCIAL

## 2025-02-06 NOTE — TELEPHONE ENCOUNTER
I spoke with the pt, who has been scheduled for 02/14 at 8:30 AM. The pt confirmed appt date and time. The pt verbalized understanding.  She will be bringing outside MRI disc for this appointment for [facility].

## 2025-02-06 NOTE — TELEPHONE ENCOUNTER
----- Message from Harika Pfeiffer sent at 2/3/2025  8:49 AM CST -----  Regarding: Appointment  Good morning,    Rboerto Moseley NP wants this patient to see neurosurgery.  Her EMG is scheduled for 2/12/25.  Can someone please contact her to schedule her neurosurgery appointment after that date?    Thank you,    DEVENDRA Calvillo.

## 2025-02-06 NOTE — TELEPHONE ENCOUNTER
----- Message from Alycia sent at 2/6/2025  8:15 AM CST -----  Contact: self  [phone]  Type:  Patient Returning Call    Who Called:Maria Isabel  Who Left Message for Patient:Alexandra  Does the patient know what this is regarding?:appt  Would the patient rather a call back or a response via [facility]? Call back   Best Call Back Number: [phone]  Additional Information:

## 2025-02-06 NOTE — TELEPHONE ENCOUNTER
I attempted to contact this patient to schedule her for a NP Appointment but no answer. I have left a detailed message.

## 2025-02-07 ENCOUNTER — PATIENT MESSAGE (OUTPATIENT)
Dept: NEUROLOGY | Facility: CLINIC | Age: 30
End: 2025-02-07
Payer: COMMERCIAL

## 2025-02-07 ENCOUNTER — HOSPITAL ENCOUNTER (OUTPATIENT)
Dept: RADIOLOGY | Facility: HOSPITAL | Age: 30
Discharge: HOME | End: 2025-02-07
Payer: COMMERCIAL

## 2025-02-07 DIAGNOSIS — M50.20 CERVICAL DISC HERNIATION: ICD-10-CM

## 2025-02-07 DIAGNOSIS — M54.12 CERVICAL RADICULOPATHY: ICD-10-CM

## 2025-02-07 DIAGNOSIS — G95.20 CERVICAL SPINAL CORD COMPRESSION: ICD-10-CM

## 2025-02-07 PROCEDURE — 72052 X-RAY EXAM NECK SPINE 6/>VWS: CPT | Mod: TC

## 2025-02-07 PROCEDURE — 72052 X-RAY EXAM NECK SPINE 6/>VWS: CPT | Mod: 26,,, | Performed by: RADIOLOGY

## 2025-02-10 ENCOUNTER — PATIENT MESSAGE (OUTPATIENT)
Dept: OTOLARYNGOLOGY | Facility: CLINIC | Age: 30
End: 2025-02-10
Payer: COMMERCIAL

## 2025-02-10 NOTE — TELEPHONE ENCOUNTER
Please tell the patient that I would recommend continuing with EMG and neurosurgeon referral for further evaluation and recommendation of her symptoms and abnormal Cervical Spine MRI findings.

## 2025-02-11 ENCOUNTER — TELEPHONE (OUTPATIENT)
Facility: CLINIC | Age: 30
End: 2025-02-11
Payer: COMMERCIAL

## 2025-02-11 ENCOUNTER — PATIENT MESSAGE (OUTPATIENT)
Dept: NEUROSURGERY | Facility: CLINIC | Age: 30
End: 2025-02-11
Payer: COMMERCIAL

## 2025-02-11 NOTE — TELEPHONE ENCOUNTER
----- Message from Latha sent at 2/11/2025  1:25 PM CST -----  Regarding: Patient Advice  Contact: Alexis dunbar  Per phone call with patient, she stated that she would like to have her MRI to be sent to the Neurosurgery, Dr CHIN Rosa.  Please return call at [phone].    Thanks,  SJ

## 2025-02-11 NOTE — TELEPHONE ENCOUNTER
Called pt to advise that [facility]'s clinic has access to the imaging results. Pt did not answer. Left vm.     Sent message to [facility] staff to review imaging results in .

## 2025-02-12 ENCOUNTER — PROCEDURE VISIT (OUTPATIENT)
Dept: NEUROLOGY | Facility: CLINIC | Age: 30
End: 2025-02-12
Payer: COMMERCIAL

## 2025-02-12 DIAGNOSIS — M54.12 CERVICAL RADICULOPATHY: ICD-10-CM

## 2025-02-12 DIAGNOSIS — R20.0 BILATERAL HAND NUMBNESS: Primary | ICD-10-CM

## 2025-02-12 PROCEDURE — 95912 NRV CNDJ TEST 11-12 STUDIES: CPT | Mod: S$GLB,,, | Performed by: PSYCHIATRY & NEUROLOGY

## 2025-02-12 NOTE — PROGRESS NOTES
Good Afternoon Mrs. Cervantes,    I have reviewed the results of your EMG-Nerve Conduction Study, and I am pleased to inform you that the results are normal. There are no indications requiring any changes to your current treatment plan.    Specifically, there is no electrophysiologic evidence of median mononeuropathy across the wrist (carpal tunnel syndrome) on either side. Additionally, there is no evidence of cervical radiculopathy, brachial plexopathy, or other entrapment mononeuropathy in either upper extremity.    We will review your results in more detail during your next follow-up visit. Please feel free to reach out if you have any questions or concerns in the meantime.    Wishing you a wonderful day!  Sincerely,  PAVAN Mejia

## 2025-02-12 NOTE — PROCEDURES
[facility]   Megan Ventura  Department of Neurology  [address] [place] [address] CHRIS Vargas  [id number]  Phone [phone]     Fax  [phone]        Full Name: Maria Isabel Cervantes Gender: Female  Patient ID: [id number] YOB: 1995      Visit Date: 2/12/2025 8:28 AM  Age: 29 Years  Examining Physician: Flavio Earl M.D.  Referring Physician: Roberto Moseley NP  Technician: TONIE Calvillo  History: EMG/NCS/BUE/Cervical radiculopathy.  C/O: Numbness and tingling of LUE, abnormal MRI C-spine.  PMHX: Left disc herniation at C4-C5 causing mild flattening of the payton cord, allergy, asthma, anemia, headaches, balance problem.    SUMMARY     Nerve conduction studies were performed in the right and left upper extremities.     The right median motor study recording the abductor pollicis brevis showed a normal amplitude, normal distal latency and normal conduction velocity. The right ulnar motor study recording the abductor digiti minimi showed a normal amplitude, normal distal latency and normal conduction velocity. No conduction block or focal slowing was present across the elbow.       The right median sensory response recording digit two showed a normal amplitude, latency and conduction velocity. The right ulnar sensory response recording digit five showed a normal amplitude, latency and conduction velocity. The right radial sensory response recording over the extensor snuff box showed a normal amplitude, latency and conduction velocity.     As routine median motor and sensory studies have a false negative rate of 25%, additional internal comparison studies were done to assess for possible median neuropathy at the wrist. These internal comparison studies (median vs. ulnar palmar mixed; median vs. ulnar sensory recording digit four; median vs. ulnar motor studies to the second lumbrical / interosseous; median vs. radial sensory studies recording digit one; median segmental sensory studies  comparing the wrist-palm and palm-digit velocities) increase the electrodiagnostic sensitivity rate to 95%. However, due to statistical issues with multiple tests, it is required that at least two studies are abnormal to reduce the false positive rate to acceptable levels. Right median-ulnar mixed palmar latencies showed a normal median latency compared to the ulnar.      The left median motor study recording the abductor pollicis brevis showed a normal amplitude, normal distal latency and normal conduction velocity. The left ulnar motor study recording the abductor digiti minimi showed a normal amplitude, normal distal latency and normal conduction velocity. No conduction block or focal slowing was present across the elbow.       The left median sensory response recording digit two showed a normal amplitude, latency and conduction velocity. The left ulnar sensory response recording digit five showed a normal amplitude, latency and conduction velocity. The left radial sensory response recording over the extensor snuff box showed a normal amplitude, latency and conduction velocity.     As routine median motor and sensory studies have a false negative rate of 25%, additional internal comparison studies were done to assess for possible median neuropathy at the wrist. These internal comparison studies (median vs. ulnar palmar mixed; median vs. ulnar sensory recording digit four; median vs. ulnar motor studies to the second lumbrical / interosseous; median vs. radial sensory studies recording digit one; median segmental sensory studies comparing the wrist-palm and palm-digit velocities) increase the electrodiagnostic sensitivity rate to 95%. However, due to statistical issues with multiple tests, it is required that at least two studies are abnormal to reduce the false positive rate to acceptable levels. Left median-ulnar mixed palmar latencies showed a normal median latency compared to the ulnar.          Needle EMG of  the right upper extremity and cervical paraspinal muscles was normal. No denervation was seen in any muscle. All motor unit potentials morphology, activation and recruitment patterns were normal.     Needle EMG of the left upper extremity and cervical paraspinal muscles was normal. No denervation was seen in any muscle. All motor unit potentials morphology, activation and recruitment patterns were normal.     IMPRESSION       This is a normal study.    There is no electrophysiologic evidence of median mononeuropathy across the wrist (carpal tunnel syndrome) on either side. In addition, there is no electrophysiologic evidence of cervical radiculopathy, brachial plexopathy or other entrapment mononeuropathy in either upper extremity.    ---------------------------------             Flavio Earl M.D., F.A.A.N.      Diplomate, American Board of Psychiatry and Neurology  Diplomate, American Board of Clinical Neurophysiology  Fellow, American Academy of Neurology             SENSORY NCS      Nerve / Sites Rec. Site Peak NP Amp PP Amp Dist Ranjit d Lat.2     ms µV µV cm m/s ms   L Median - Dig II      Wrist II 2.88 14.0 24.9 13 56.2 2.88      Ref.  3.40  20.0  48.0    L Median - Ulnar - Palmar      Median Wrist 1.94 61.9 70.2 8 54.1 1.94      Ulnar Wrist 1.96 10.7 12.2 8 1920.0 0.02   L Ulnar - Dig V      Wrist Dig V 2.67 5.6 10.2 11 51.3 2.67      Ref.  3.10  12.0  48.0    L Radial - Snuff box      Forearm Snuff box 2.56 39.1 39.4 10 49.5 2.56      Ref.  2.70 18.0       R Median - Dig II      Wrist II 2.92 23.5 29.7 13 54.3 2.92      Ref.  3.40  20.0  48.0    R Median - Ulnar - Palmar      Median Wrist 2.02 56.4 64.5 8 50.5 2.02      Ulnar Wrist 2.08 7.1 15.3 8 640.0 0.06   R Ulnar - Dig V      Wrist Dig V 2.65 13.4 10.3 11 65.2 2.65      Ref.  3.10  12.0  48.0    R Radial - Snuff box      Forearm Snuff box 2.56 34.5 42.7 10 51.6 2.56      Ref.  2.70 18.0           MOTOR NCS      Nerve / Sites Rec. Site Lat Amp Dist Ranjit     ms  mV cm m/s   L Median - APB      Wrist APB 2.73 14.4 8       Ref.  3.90 6.0        Elbow APB 6.35 13.8 20.5 56.6      Ref.     49.0   L Ulnar - ADM      Wrist ADM 2.73 11.9 8       Ref.  3.10 7.0        B.Elbow ADM 6.29 9.8 23 64.6      Ref.     50.0      A.Elbow ADM 7.79 9.6 10 66.7   R Median - APB      Wrist APB 3.31 10.0 8       Ref.  3.90 6.0        Elbow APB 7.23 7.8 22 56.2      Ref.     49.0   R Ulnar - ADM      Wrist ADM 2.50 10.1 8       Ref.  3.10 7.0        B.Elbow ADM 6.33 9.4 22 57.4      Ref.     50.0      A.Elbow ADM 8.02 9.3 10 59.3       EMG Summary Table BILATERAL     Spontaneous MUAP Recruitment   Muscle Nerve Roots IA Fib PSW Fasc Other Amp Dur. PPP Pattern   First dorsal interosseous Ulnar C8-T1 N None None None - N N N N   Infraspinatus Suprascapular C5-C6 N None None None - N N N N   Triceps brachii Radial C6-C8 N None None None - N N N N   Deltoid (posterior) Axillary C5-C6 N None None None - N N N N   Biceps brachii Musculocutaneous C5-C6 N None None None - N N N N   Flexor carpi radialis Median C6-C7 N None None None - N N N N   Pronator teres Median C6-C7 N None None None - N N N N   Cervical paraspinals Spinal C4-C8 N None None None - N N N N           ---------------------------------             Flavio Earl M.D., F.A.A.N.      Diplomate, American Board of Psychiatry and Neurology  Diplomate, American Board of Clinical Neurophysiology  Fellow, American Academy of Neurology

## 2025-02-14 ENCOUNTER — OFFICE VISIT (OUTPATIENT)
Dept: NEUROSURGERY | Facility: CLINIC | Age: 30
End: 2025-02-14
Payer: COMMERCIAL

## 2025-02-14 VITALS
WEIGHT: 184.06 LBS | SYSTOLIC BLOOD PRESSURE: 97 MMHG | DIASTOLIC BLOOD PRESSURE: 67 MMHG | HEART RATE: 70 BPM | BODY MASS INDEX: 31.6 KG/M2

## 2025-02-14 DIAGNOSIS — M50.20 CERVICAL DISC HERNIATION: ICD-10-CM

## 2025-02-14 DIAGNOSIS — M54.12 CERVICAL RADICULOPATHY: ICD-10-CM

## 2025-02-14 DIAGNOSIS — G95.20 CERVICAL SPINAL CORD COMPRESSION: ICD-10-CM

## 2025-02-14 PROCEDURE — 99999 PR PBB SHADOW E&M-EST. PATIENT-LVL III: CPT | Mod: PBBFAC,,, | Performed by: PHYSICIAN ASSISTANT

## 2025-02-14 NOTE — PROGRESS NOTES
Subjective:      Patient ID: Maria Isabel Cervantes is a 29 y.o. female.    Chief Complaint: No chief complaint on file.    Pt referred by neurology due to results of cervical MRI. Brought MRI disc with her today.   N/T in Mattie UE. Radiates from elbow into hands.   Denies loss of  strength. Denies symptoms keeping her from ADLs.   States she gets frequent headaches that keep her from sleeping and functioning during the day. Is also struggling with memory and is very forgetful.   Complains of dizziness along with the headaches, but has not had LOB issues.   Currently taking Topamax for headaches.   Pain = 8/10.    Patient reports HA started years ago, and attributed it to her sinuses. Sometimes last up to a week at a time. Would present to doctor who would put on her abx as well as antihistamine.     Whenever pt has the headaches she has double vision.     Denies vision abnl.     Severe headaches, light headed, cannot sleep, waking her up at night. Also has lower back pain never tested positive for covid.      Also having forgetfulness, during the episodes of Benito.     Denies N/V. No weakness    Since starting topamax has gotten much better.       MRI BRAIN AND CERVICAL SPINE WNL no acute abl.    Objective:       Neurosurgery Physical Exam  Ortho/SPM Exam  Ortho Exam    Physical Exam:  Nursing note and vitals reviewed.     Constitutional: She appears well-nourished. She is not diaphoretic. No distress.      Eyes: Pupils are equal, round, and reactive to light. EOM are normal. Negative for nystagmus      Cardiovascular: Normal rate and regular rhythm.      Psych/Behavior: She is alert. She is oriented to person, place, and time. She has a normal mood and affect.      Musculoskeletal:        Back: Range of motion is limited. There is tenderness. Muscle strength is 5/5.       Right Lower Extremities: Range of motion is full. There is no tenderness. Muscle strength is 5/5. Tone is normal.        Left Lower Extremities: There is  no tenderness. Muscle strength is 5/5. Tone is normal.      Neurological:        Sensory: There is no sensory deficit in the trunk. There is no sensory deficit in the extremities.        Cranial nerves: Cranial nerve(s) II, III, IV, V, VI, VII, VIII, IX, X, XI and XII are intact.      General     Nursing note and vitals reviewed.  Constitutional: She is oriented to person, place, and time. She appears well-nourished. No distress.   Eyes: EOM are normal. Pupils are equal, round, and reactive to light.   Cardiovascular:  Normal rate and regular rhythm.            Neurological: She is alert and oriented to person, place, and time.   Psychiatric: She has a normal mood and affect.              I  reviewed all pertinent imaging regarding this case.  Assessment:     1. Cervical radiculopathy    2. Cervical disc herniation    3. Cervical spinal cord compression      Plan:     Cervical radiculopathy  -     Ambulatory referral/consult to Neurosurgery    Cervical disc herniation  -     Ambulatory referral/consult to Neurosurgery    Cervical spinal cord compression  -     Ambulatory referral/consult to Neurosurgery      Patient is very nice 29-year-old female who presents to clinic today as a referral by Neurology for evaluation of MRI brain and MRI cervical spine I independently reviewed these imaging studies on a disc.  I do not appreciate any acute abnormalities.  I do not see any masses or lesions I do not appreciate hydrocephalus I do not see any cervical stenosis or any thing visible on imaging that would contribute to her symptoms.  She does have a very small disc protrusion at C4-5 but this is not causing her symptoms.     I explained to the patient that I would like to rule out pseudotumor cerebri/idiopathic intracranial hypertension and in order to do so she needs referral to ophthalmologist for a full visual field testingas well as evaluation of her optic nerve to rule out papilledema.  This is the least invasive  first step. Continue care and follow up per neurology.       Will have patient follow up with Dr. Miller.     Thank you for the referral   Please call with any questions    Neurosurgery     Disclaimer: This note was prepared using a voice recognition system and is likely to have sound alike errors within the text.

## 2025-03-26 ENCOUNTER — OFFICE VISIT (OUTPATIENT)
Dept: OPHTHALMOLOGY | Facility: CLINIC | Age: 30
End: 2025-03-26
Payer: COMMERCIAL

## 2025-03-26 DIAGNOSIS — H52.13 MYOPIA OF BOTH EYES WITH ASTIGMATISM: ICD-10-CM

## 2025-03-26 DIAGNOSIS — H52.203 MYOPIA OF BOTH EYES WITH ASTIGMATISM: ICD-10-CM

## 2025-03-26 DIAGNOSIS — R51.9 FREQUENT HEADACHES: Primary | ICD-10-CM

## 2025-03-26 PROCEDURE — 92015 DETERMINE REFRACTIVE STATE: CPT | Mod: S$GLB,,, | Performed by: OPTOMETRIST

## 2025-03-26 PROCEDURE — 99999 PR PBB SHADOW E&M-EST. PATIENT-LVL III: CPT | Mod: PBBFAC,,, | Performed by: OPTOMETRIST

## 2025-03-26 PROCEDURE — 1160F RVW MEDS BY RX/DR IN RCRD: CPT | Mod: CPTII,S$GLB,, | Performed by: OPTOMETRIST

## 2025-03-26 PROCEDURE — 1159F MED LIST DOCD IN RCRD: CPT | Mod: CPTII,S$GLB,, | Performed by: OPTOMETRIST

## 2025-03-26 PROCEDURE — 92004 COMPRE OPH EXAM NEW PT 1/>: CPT | Mod: S$GLB,,, | Performed by: OPTOMETRIST

## 2025-03-26 NOTE — PROGRESS NOTES
HPI     Annual Exam            Comments: Patient state vision stable   No pain today   No gtts   Was referral by neurologist          Last edited by Irina Mishra on 3/26/2025  1:39 PM.            Assessment /Plan     For exam results, see Encounter Report.    1. Frequent headaches  Not likely ocular in origin  Minimal refractive error  Normal, well-perfused optic nerves bilaterally with no edema  Continue care with PCP and/or specialists     2. Myopia of both eyes with astigmatism  Eyeglass Final Rx       Eyeglass Final Rx         Sphere Cylinder Axis    Right -0.50 +1.25 044    Left Zenda +1.00 142      Type: SVL    Expiration Date: 3/26/2026   PD 67                    RTC 1 yr for dilated eye exam or sooner if any changes to vision.   Discussed above and answered questions.